# Patient Record
Sex: MALE | Race: BLACK OR AFRICAN AMERICAN | NOT HISPANIC OR LATINO | URBAN - METROPOLITAN AREA
[De-identification: names, ages, dates, MRNs, and addresses within clinical notes are randomized per-mention and may not be internally consistent; named-entity substitution may affect disease eponyms.]

---

## 2019-12-04 ENCOUNTER — EMERGENCY (EMERGENCY)
Facility: HOSPITAL | Age: 49
LOS: 0 days | Discharge: HOME | End: 2019-12-04
Attending: STUDENT IN AN ORGANIZED HEALTH CARE EDUCATION/TRAINING PROGRAM
Payer: SELF-PAY

## 2019-12-04 VITALS — WEIGHT: 151.24 LBS | HEIGHT: 72 IN

## 2019-12-04 VITALS
RESPIRATION RATE: 18 BRPM | DIASTOLIC BLOOD PRESSURE: 105 MMHG | OXYGEN SATURATION: 100 % | HEART RATE: 84 BPM | SYSTOLIC BLOOD PRESSURE: 161 MMHG | TEMPERATURE: 98 F

## 2019-12-04 DIAGNOSIS — G89.29 OTHER CHRONIC PAIN: ICD-10-CM

## 2019-12-04 DIAGNOSIS — M54.5 LOW BACK PAIN: ICD-10-CM

## 2019-12-04 DIAGNOSIS — F17.200 NICOTINE DEPENDENCE, UNSPECIFIED, UNCOMPLICATED: ICD-10-CM

## 2019-12-04 PROCEDURE — 99053 MED SERV 10PM-8AM 24 HR FAC: CPT

## 2019-12-04 PROCEDURE — 99283 EMERGENCY DEPT VISIT LOW MDM: CPT

## 2019-12-04 RX ORDER — ACETAMINOPHEN 500 MG
2 TABLET ORAL
Qty: 16 | Refills: 0
Start: 2019-12-04 | End: 2019-12-05

## 2019-12-04 RX ORDER — ACETAMINOPHEN 500 MG
650 TABLET ORAL ONCE
Refills: 0 | Status: COMPLETED | OUTPATIENT
Start: 2019-12-04 | End: 2019-12-04

## 2019-12-04 RX ORDER — IBUPROFEN 200 MG
600 TABLET ORAL ONCE
Refills: 0 | Status: COMPLETED | OUTPATIENT
Start: 2019-12-04 | End: 2019-12-04

## 2019-12-04 RX ORDER — IBUPROFEN 200 MG
1 TABLET ORAL
Qty: 12 | Refills: 0
Start: 2019-12-04 | End: 2019-12-06

## 2019-12-04 RX ADMIN — Medication 600 MILLIGRAM(S): at 08:42

## 2019-12-04 RX ADMIN — Medication 650 MILLIGRAM(S): at 08:42

## 2019-12-04 NOTE — ED PROVIDER NOTE - OBJECTIVE STATEMENT
Patient is a 50 yo male with chronic lower back pain x "many years." Patient reports that he recently located from [place], patient took a bus and is staying at [facility] at this time; reports had left knee surgery back in 1980s and reports that his knee did not heel properly putting "pressure" on his lower back; has not been taking any medications for pain; no new trauma or injury; no incontinence of bowel or bladder function, no numbness or tingling; no weakness.

## 2019-12-04 NOTE — ED PROVIDER NOTE - MUSCULOSKELETAL, MLM
Spine appears normal, range of motion is not limited, (+) bilateral paravertebral lumbar tenderness; no midline tenderness; ambulatory in ED

## 2019-12-04 NOTE — ED ADULT NURSE REASSESSMENT NOTE - NS ED NURSE REASSESS COMMENT FT1
Pt seen and evaluated by MD Melendez.  Pt in waiting room waiting to speak with social work. Pt medically cleared by MD.

## 2019-12-04 NOTE — ED ADULT TRIAGE NOTE - WEIGHT IN LBS
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 Prior hx of 5 Detox at [facility] (most of them AMA), last Detox at [facility] 1/16/2019 – 1/18/2019 AMA. S/P documented Seizure 8/20/2019 was admitted to [facility] 8/20/2019 – 8/22/2019, continued to use Xanax after D/C, as per Patient,he was giving fake Urine to his MMTP.

## 2019-12-04 NOTE — ED ADULT NURSE NOTE - NSIMPLEMENTINTERV_GEN_ALL_ED
Implemented All Universal Safety Interventions:  [place] to call system. Call bell, personal items and telephone within reach. Instruct patient to call for assistance. Room bathroom lighting operational. Non-slip footwear when patient is off stretcher. Physically safe environment: no spills, clutter or unnecessary equipment. Stretcher in lowest position, wheels locked, appropriate side rails in place.

## 2019-12-04 NOTE — ED PROVIDER NOTE - CLINICAL SUMMARY MEDICAL DECISION MAKING FREE TEXT BOX
48 yo male with lower back pain; no red flags; pain control; SW consult (patient new to area, has no insurance)

## 2019-12-04 NOTE — ED PROVIDER NOTE - PATIENT PORTAL LINK FT
You can access the FollowMyHealth Patient Portal offered by [facility] by registering at the following website: [email or web address]. By joining [employer]’s FollowMyHealth portal, you will also be able to view your health information using other applications (apps) compatible with our system.

## 2021-09-03 ENCOUNTER — OFFICE VISIT (OUTPATIENT)
Dept: URBAN - METROPOLITAN AREA CLINIC 78 | Facility: CLINIC | Age: 51
End: 2021-09-03
Payer: COMMERCIAL

## 2021-09-03 DIAGNOSIS — Z12.11 COLON CANCER SCREENING: ICD-10-CM

## 2021-09-03 DIAGNOSIS — K59.01 CONSTIPATION: ICD-10-CM

## 2021-09-03 PROCEDURE — 99203 OFFICE O/P NEW LOW 30 MIN: CPT | Performed by: INTERNAL MEDICINE

## 2021-09-03 RX ORDER — SODIUM PICOSULFATE, MAGNESIUM OXIDE, AND ANHYDROUS CITRIC ACID 10; 3.5; 12 MG/160ML; G/160ML; G/160ML
160 ML LIQUID ORAL
Qty: 1 PACK | Refills: 0 | OUTPATIENT
Start: 2021-09-03 | End: 2021-09-04

## 2021-09-03 NOTE — HPI-TODAY'S VISIT:
The patient presents for a colon cancer screening.   Patient has NEVER had a colonoscopy.   There is no family history of colon polyps or cancer.   Patient denies change in appetite and weight.  Patient denies bleeding per rectum.  Pt has been having severe constipation

## 2021-11-12 ENCOUNTER — OFFICE VISIT (OUTPATIENT)
Dept: URBAN - METROPOLITAN AREA SURGERY CENTER 15 | Facility: SURGERY CENTER | Age: 51
End: 2021-11-12

## 2022-01-13 ENCOUNTER — OFFICE VISIT (OUTPATIENT)
Dept: URBAN - METROPOLITAN AREA SURGERY CENTER 15 | Facility: SURGERY CENTER | Age: 52
End: 2022-01-13

## 2022-03-03 PROBLEM — 14760008 CONSTIPATION: Status: ACTIVE | Noted: 2021-09-03

## 2022-03-17 ENCOUNTER — OFFICE VISIT (OUTPATIENT)
Dept: URBAN - METROPOLITAN AREA SURGERY CENTER 15 | Facility: SURGERY CENTER | Age: 52
End: 2022-03-17

## 2023-10-30 ENCOUNTER — DASHBOARD ENCOUNTERS (OUTPATIENT)
Age: 53
End: 2023-10-30

## 2023-10-30 ENCOUNTER — OFFICE VISIT (OUTPATIENT)
Dept: URBAN - METROPOLITAN AREA CLINIC 78 | Facility: CLINIC | Age: 53
End: 2023-10-30

## 2025-04-28 NOTE — ED PROVIDER NOTE - HEME LYMPH, MLM
Chart reviewed and updated. Reconciled immunizations, health maintenance and care everywhere.  Placed Podiatry referral since A1c already in and sent portal msg for scheduling.      Cinthya Thrasher, [facility]  Panel Care Coordinator  [facility]  [phone]  For Gerhard, Michael ZAMORA MD       No adenopathy or splenomegaly. No cervical or inguinal lymphadenopathy.
